# Patient Record
Sex: MALE | Race: WHITE | Employment: OTHER | URBAN - METROPOLITAN AREA
[De-identification: names, ages, dates, MRNs, and addresses within clinical notes are randomized per-mention and may not be internally consistent; named-entity substitution may affect disease eponyms.]

---

## 2023-06-13 PROBLEM — Z82.49 FAMILY HISTORY OF CARDIOVASCULAR DISEASE: Status: ACTIVE | Noted: 2023-06-13

## 2023-06-13 PROBLEM — E78.00 PURE HYPERCHOLESTEROLEMIA: Status: ACTIVE | Noted: 2023-06-13

## 2023-06-13 PROBLEM — R01.1 MURMUR, CARDIAC: Status: ACTIVE | Noted: 2023-06-13

## 2023-06-13 PROBLEM — R93.1 AGATSTON CORONARY ARTERY CALCIUM SCORE GREATER THAN 400: Status: ACTIVE | Noted: 2023-06-13

## 2023-06-13 PROBLEM — I10 PRIMARY HYPERTENSION: Status: ACTIVE | Noted: 2023-06-13

## 2023-08-08 ENCOUNTER — OFFICE VISIT (OUTPATIENT)
Age: 68
End: 2023-08-08

## 2023-08-08 VITALS
BODY MASS INDEX: 23.99 KG/M2 | WEIGHT: 162 LBS | HEIGHT: 69 IN | SYSTOLIC BLOOD PRESSURE: 110 MMHG | DIASTOLIC BLOOD PRESSURE: 60 MMHG | HEART RATE: 60 BPM

## 2023-08-08 DIAGNOSIS — R93.1 AGATSTON CORONARY ARTERY CALCIUM SCORE GREATER THAN 400: Primary | ICD-10-CM

## 2023-08-08 DIAGNOSIS — I10 PRIMARY HYPERTENSION: ICD-10-CM

## 2023-08-08 DIAGNOSIS — E78.00 PURE HYPERCHOLESTEROLEMIA: ICD-10-CM

## 2023-08-08 DIAGNOSIS — R01.1 MURMUR, CARDIAC: ICD-10-CM

## 2023-08-08 RX ORDER — ASPIRIN 81 MG/1
81 TABLET ORAL DAILY
COMMUNITY

## 2023-08-08 RX ORDER — VIT C/B6/B5/MAGNESIUM/HERB 173 50-5-6-5MG
1500 CAPSULE ORAL DAILY
COMMUNITY

## 2023-08-08 RX ORDER — CHLORAL HYDRATE 500 MG
CAPSULE ORAL DAILY
COMMUNITY

## 2023-08-08 RX ORDER — ATORVASTATIN CALCIUM 40 MG/1
40 TABLET, FILM COATED ORAL
COMMUNITY
Start: 2023-05-10

## 2023-08-08 ASSESSMENT — ENCOUNTER SYMPTOMS
HOARSE VOICE: 0
COLOR CHANGE: 0
HEMATOCHEZIA: 0
BLURRED VISION: 0
CHEST TIGHTNESS: 0
BOWEL INCONTINENCE: 0
ABDOMINAL PAIN: 0
SHORTNESS OF BREATH: 0
HEMATEMESIS: 0
WHEEZING: 0
ORTHOPNEA: 0
DIARRHEA: 0
SPUTUM PRODUCTION: 0

## 2023-08-08 NOTE — PROGRESS NOTES
[facility] CARDIOLOGY  [address] [facility], [address] [facility]  PHONE: [phone]        23        NAME:  Oanh Urbina  : 1955  MRN: [id number]       SUBJECTIVE:   Oanh Urbina is a 77 y.o. male seen for a follow up visit regarding the following: Recently,the patient evaluated for an elevated FORTINO ( 492). He had a follow up normal stress MPI scan and echo revealing normal LV EF with mild MR. He returns for follow up of test results. I discussed test results with the patient and his wife. Chief Complaint   Patient presents with    Hypertension    Results     Clite, echo       HPI:    Coronary Artery Disease  Presents for follow-up visit. Pertinent negatives include no chest pain, chest pressure, chest tightness, dizziness, leg swelling, muscle weakness, palpitations, shortness of breath or weight gain. The symptoms have been stable. Compliance with diet is good. Compliance with exercise is good. Compliance with medications is good. Past Medical History, Past Surgical History, Family history, Social History, and Medications were all reviewed with the patient today and updated as necessary.          Current Outpatient Medications:     Omega-3 1000 MG CAPS, Take by mouth daily, Disp: , Rfl:     turmeric 500 MG CAPS, Take 3 capsules by mouth daily, Disp: , Rfl:     Magnesium 100 MG CAPS, Take 160 mg by mouth daily, Disp: , Rfl:     atorvastatin (LIPITOR) 40 MG tablet, Take 1 tablet by mouth, Disp: , Rfl:     aspirin 81 MG EC tablet, Take 1 tablet by mouth daily, Disp: , Rfl:     lisinopril (PRINIVIL;ZESTRIL) 10 MG tablet, Take 1 tablet by mouth daily, Disp: , Rfl:   No Known Allergies  Past Medical History:   Diagnosis Date    Hearing loss     Hyperlipidemia     Hypertension      Past Surgical History:   Procedure Laterality Date    HYDROCELE EXCISION      INGUINAL HERNIA REPAIR Right     TONSILLECTOMY       Family History   Problem Relation Age of Onset    Heart Attack